# Patient Record
Sex: FEMALE | Race: WHITE | NOT HISPANIC OR LATINO | Employment: OTHER | URBAN - METROPOLITAN AREA
[De-identification: names, ages, dates, MRNs, and addresses within clinical notes are randomized per-mention and may not be internally consistent; named-entity substitution may affect disease eponyms.]

---

## 2017-02-01 PROBLEM — Z79.899 ENCOUNTER FOR LONG-TERM (CURRENT) USE OF OTHER MEDICATIONS: Status: ACTIVE | Noted: 2017-02-01

## 2017-02-01 PROBLEM — E79.0 HYPERURICEMIA: Status: ACTIVE | Noted: 2017-02-01

## 2020-05-22 ENCOUNTER — LAB VISIT (OUTPATIENT)
Dept: PRIMARY CARE CLINIC | Facility: CLINIC | Age: 75
End: 2020-05-22
Payer: MEDICARE

## 2020-05-22 DIAGNOSIS — R05.9 COUGH: Primary | ICD-10-CM

## 2020-05-22 PROCEDURE — U0003 INFECTIOUS AGENT DETECTION BY NUCLEIC ACID (DNA OR RNA); SEVERE ACUTE RESPIRATORY SYNDROME CORONAVIRUS 2 (SARS-COV-2) (CORONAVIRUS DISEASE [COVID-19]), AMPLIFIED PROBE TECHNIQUE, MAKING USE OF HIGH THROUGHPUT TECHNOLOGIES AS DESCRIBED BY CMS-2020-01-R: HCPCS

## 2020-05-23 LAB — SARS-COV-2 RNA RESP QL NAA+PROBE: NOT DETECTED

## 2020-12-21 ENCOUNTER — PATIENT MESSAGE (OUTPATIENT)
Dept: OTHER | Facility: OTHER | Age: 75
End: 2020-12-21

## 2021-01-11 ENCOUNTER — IMMUNIZATION (OUTPATIENT)
Dept: FAMILY MEDICINE | Facility: CLINIC | Age: 76
End: 2021-01-11
Payer: MEDICARE

## 2021-01-11 DIAGNOSIS — Z23 NEED FOR VACCINATION: ICD-10-CM

## 2021-01-11 PROCEDURE — 91300 COVID-19, MRNA, LNP-S, PF, 30 MCG/0.3 ML DOSE VACCINE: CPT | Mod: PBBFAC,PO

## 2021-02-01 ENCOUNTER — IMMUNIZATION (OUTPATIENT)
Dept: FAMILY MEDICINE | Facility: CLINIC | Age: 76
End: 2021-02-01
Payer: MEDICARE

## 2021-02-01 DIAGNOSIS — Z23 NEED FOR VACCINATION: Primary | ICD-10-CM

## 2021-02-01 PROCEDURE — 91300 COVID-19, MRNA, LNP-S, PF, 30 MCG/0.3 ML DOSE VACCINE: CPT | Mod: PBBFAC,PO

## 2021-02-01 PROCEDURE — 0002A COVID-19, MRNA, LNP-S, PF, 30 MCG/0.3 ML DOSE VACCINE: CPT | Mod: PBBFAC,PO

## 2021-09-27 ENCOUNTER — IMMUNIZATION (OUTPATIENT)
Dept: FAMILY MEDICINE | Facility: CLINIC | Age: 76
End: 2021-09-27
Payer: MEDICARE

## 2021-09-27 DIAGNOSIS — Z23 NEED FOR VACCINATION: Primary | ICD-10-CM

## 2021-09-27 PROCEDURE — 0003A COVID-19, MRNA, LNP-S, PF, 30 MCG/0.3 ML DOSE VACCINE: CPT | Mod: PBBFAC | Performed by: FAMILY MEDICINE

## 2021-09-27 PROCEDURE — 91300 COVID-19, MRNA, LNP-S, PF, 30 MCG/0.3 ML DOSE VACCINE: CPT | Mod: PBBFAC,PO

## 2023-11-29 ENCOUNTER — TELEPHONE (OUTPATIENT)
Dept: SURGERY | Facility: CLINIC | Age: 78
End: 2023-11-29
Payer: COMMERCIAL

## 2023-11-29 NOTE — TELEPHONE ENCOUNTER
"Called to speak to patient about rescheduling her MRI, patient states that she feels like she can not breath due to being "squished" in the first attempt at the MRI test. She stated that she thinks she will not be able to endure the test and inquired about the alternatives. I told her we could discuss it tomorrow and instructed her to go to the ER immediately to be evaluated for shortness of breath, she stated that she did not want to wait around at the ER, I reinforced the need to go to the ER for SOB and contact information provided for [facility] ER, she agreed to go.   "

## 2023-11-30 DIAGNOSIS — D05.11 DUCTAL CARCINOMA IN SITU (DCIS) OF RIGHT BREAST: Primary | ICD-10-CM

## 2023-12-01 ENCOUNTER — TELEPHONE (OUTPATIENT)
Dept: HEMATOLOGY/ONCOLOGY | Facility: CLINIC | Age: 78
End: 2023-12-01
Payer: COMMERCIAL

## 2023-12-01 NOTE — PROGRESS NOTES
[facility] - Breast Surgery   History and Physical    Subjective:      Mina Quinonez is a 78 y.o. female     No symptoms. Found on imaging.     No MRI. Could not tolerate  CT chest negative    PMHX  COPD  ER visit  for rib and mid back pain after MRI breast attempted. CT neg  BMI 42   DM  A1C 9.6 2022  Hysterectomy with BSO and appy at 45    Menarche at age 13 years old . . Age at first live birth n/a. breast feed n/a. LMP n/a.  Menopause at 45 years old. HRT- Took  premarin for 10 years   Personal history of ovarian/breast No history. Had  previous breast biopsy 2019. Denies Genetic testing.    Family history cancer:Father Prostate cancer                                     Sister Uterine cancer                                     3 Paternal Aunts breast cancer    Smoker Pk/yr quit date:never a smoker  Covid-19 vaccine with all booster right arm     Relevant medical history: Biopsy preformed at [facility]    Relevant info: Patient is alone       Patient Active Problem List   Diagnosis    DM w/o complication type II    Essential hypertension    Hyperlipidemia    Hypothyroidism due to acquired atrophy of thyroid    Vitamin D deficiency    Ductal carcinoma in situ (DCIS) of right breast     Current Outpatient Medications on File Prior to Visit   Medication Sig Dispense Refill    ASPIRIN (ASPIR-81 ORAL) 1 tablet once daily.      atorvastatin (LIPITOR) 20 MG tablet TAKE 1 TABLET EVERY DAY 90 tablet 3    FLUAD QUAD 2020-21,65Y UP,,PF, 60 mcg (15 mcg x 4)/0.5 mL Syrg ADM 0.5ML IM UTD      FREESTYLE LANCETS 28 gauge lancets 1 each once daily.  11    FREESTYLE LITE STRIPS Strp 1 strip once daily.   11    levothyroxine (SYNTHROID) 100 MCG tablet Take 1 tablet (100 mcg total) by mouth once daily. 90 tablet 0    lisinopriL 10 MG tablet TAKE 1 TABLET EVERY DAY 90 tablet 3    metFORMIN (GLUCOPHAGE-XR) 500 MG ER 24hr tablet Take 1 tablet (500 mg total) by mouth once daily. 90 tablet 3     metoprolol succinate (TOPROL-XL) 25 MG 24 hr tablet TAKE 1 TABLET EVERY DAY 90 tablet 3    SITagliptan-metformin (JANUMET) 50-1,000 mg per tablet Take 1 tablet by mouth once daily. 90 tablet 3    TYRVAYA 0.03 mg/spray sprm       XIIDRA 5 % Dpet       HYDROcodone-acetaminophen (NORCO) 5-325 mg per tablet Take 1 tablet by mouth every 6 (six) hours as needed for Pain. (Patient not taking: Reported on 12/4/2023) 12 tablet 0    ipratropium-albuteroL (COMBIVENT)  mcg/actuation inhaler Inhale 1 puff into the lungs every 6 (six) hours as needed for Wheezing. Rescue 4 g 2    levalbuterol (XOPENEX) 0.63 mg/3 mL nebulizer solution Take 3 mLs (0.63 mg total) by nebulization every 6 (six) hours as needed for Wheezing. Rescue 120 mL 1    naproxen sodium (ANAPROX) 220 MG tablet 2 tablets once as needed.       No current facility-administered medications on file prior to visit.     Review of patient's allergies indicates:   Allergen Reactions    Levofloxacin      Other reaction(s): severe leg cramps     Past Medical History:   Diagnosis Date    Allergy     Diabetes type 2, controlled     Hyperlipidemia     Hypertension     Thyroid disease      Past Surgical History:   Procedure Laterality Date    APPENDECTOMY      BREAST BIOPSY Right 05/06/2019    benign spindle cell tumor    BREAST BIOPSY Right 05/06/2019    benign lymph node    DENTAL SURGERY      HYSTERECTOMY      KNEE SURGERY Right     OOPHORECTOMY      orif arm Left      Family History   Problem Relation Age of Onset    Hypertension Mother     Hyperlipidemia Mother     Hypertension Father     Hyperlipidemia Father     Prostate cancer Father     Breast cancer Paternal Aunt         unknown age    Breast cancer Paternal Aunt         unknown age    Breast cancer Paternal Aunt      Social History     Socioeconomic History    Marital status: Single   Tobacco Use    Smoking status: Never    Smokeless tobacco: Never   Substance and Sexual Activity    Alcohol use: Yes      "Comment: rarely    Drug use: No    Sexual activity: Never     Social Determinants of Health     Financial Resource Strain: Medium Risk (12/20/2022)    Overall Financial Resource Strain (CARDIA)     Difficulty of Paying Living Expenses: Somewhat hard   Food Insecurity: No Food Insecurity (12/20/2022)    Hunger Vital Sign     Worried About Running Out of Food in the Last Year: Never true     Ran Out of Food in the Last Year: Never true   Transportation Needs: No Transportation Needs (12/20/2022)    PRAPARE - Transportation     Lack of Transportation (Medical): No     Lack of Transportation (Non-Medical): No   Physical Activity: Sufficiently Active (12/20/2022)    Exercise Vital Sign     Days of Exercise per Week: 4 days     Minutes of Exercise per Session: 60 min   Stress: Stress Concern Present (12/20/2022)    [employer] [place] [employer] - Occupational Stress Questionnaire     Feeling of Stress : To some extent   Social Connections: Unknown (12/20/2022)    Social Connection and Isolation Panel [NHANES]     Frequency of Communication with Friends and Family: More than three times a week     Frequency of Social Gatherings with Friends and Family: More than three times a week     Active Member of Clubs or Organizations: Yes     Attends Club or Organization Meetings: More than 4 times per year     Marital Status: Never    Housing Stability: Unknown (12/20/2022)    Housing Stability Vital Sign     Unable to Pay for Housing in the Last Year: No     Unstable Housing in the Last Year: No         Review of Systems    No CP, No SOB      Objective:      BP (!) 154/90 (BP Location: Right arm, Patient Position: Sitting, BP Method: Large (Automatic))   Pulse 95   Temp 98 °F (36.7 °C) (Temporal)   Resp 16   Ht 5' 5" (1.651 m)   Wt 113.5 kg (250 lb 3.6 oz)   SpO2 96%   BMI 41.64 kg/m²     Constitutional: NAD AAOX4  HEENT: EOMI, nonicteric sclera  NECK: no mass, no thyromegaly, no lymphadenopathy  CV: " regular rate  PULM: good respiratory effort  ABDOMEN: soft, Nontender, nondistended. No guarding. No rebound.  MUSCULOSKELETAL: Good ROM  SKIN: No rashes or ulcerations seen.   NEUROLOGIC: CN grossly intact. Motor, sensory grossly intact    Breast exam   pendulous  Right: No mass, discharge, dimpling, lymphadenopathy  Mild bruising right UOQ. No masses appreciated   Left:  No mass, discharge, dimpling, lymphadenopathy      No visits with results within 6 Week(s) from this visit.   Latest known visit with results is:   Lab Visit on 12/16/2022   Component Date Value Ref Range Status    WBC 12/16/2022 3.91  3.90 - 12.70 K/uL Final    RBC 12/16/2022 3.78 (L)  4.00 - 5.40 M/uL Final    Hemoglobin 12/16/2022 11.4 (L)  12.0 - 16.0 g/dL Final    Hematocrit 12/16/2022 34.1 (L)  37.0 - 48.5 % Final    MCV 12/16/2022 90  82 - 98 fL Final    MCH 12/16/2022 30.2  27.0 - 31.0 pg Final    MCHC 12/16/2022 33.4  32.0 - 36.0 g/dL Final    RDW 12/16/2022 12.8  11.5 - 14.5 % Final    Platelets 12/16/2022 162  150 - 450 K/uL Final    MPV 12/16/2022 9.9  9.2 - 12.9 fL Final    Immature Granulocytes 12/16/2022 0.5  0.0 - 0.5 % Final    Gran # (ANC) 12/16/2022 2.3  1.8 - 7.7 K/uL Final    Immature Grans (Abs) 12/16/2022 0.02  0.00 - 0.04 K/uL Final    Comment: Mild elevation in immature granulocytes is non specific and   can be seen in a variety of conditions including stress response,   acute inflammation, trauma and pregnancy. Correlation with other   laboratory and clinical findings is essential.      Lymph # 12/16/2022 1.1  1.0 - 4.8 K/uL Final    Mono # 12/16/2022 0.4  0.3 - 1.0 K/uL Final    Eos # 12/16/2022 0.1  0.0 - 0.5 K/uL Final    Baso # 12/16/2022 0.02  0.00 - 0.20 K/uL Final    nRBC 12/16/2022 0  0 /100 WBC Final    Gran % 12/16/2022 58.2  38.0 - 73.0 % Final    Lymph % 12/16/2022 29.2  18.0 - 48.0 % Final    Mono % 12/16/2022 9.0  4.0 - 15.0 % Final    Eosinophil % 12/16/2022 2.6  0.0 - 8.0 % Final    Basophil % 12/16/2022  0.5  0.0 - 1.9 % Final    Differential Method 12/16/2022 Automated   Final    Sodium 12/16/2022 136  136 - 145 mmol/L Final    Potassium 12/16/2022 4.6  3.5 - 5.1 mmol/L Final    Chloride 12/16/2022 104  95 - 110 mmol/L Final    CO2 12/16/2022 28  22 - 31 mmol/L Final    Glucose 12/16/2022 230 (H)  70 - 110 mg/dL Final    Comment: The ADA recommends the following guidelines for fasting glucose:    Normal:       less than 100 mg/dL    Prediabetes:  100 mg/dL to 125 mg/dL    Diabetes:     126 mg/dL or higher      BUN 12/16/2022 16  7 - 18 mg/dL Final    Creatinine 12/16/2022 0.67  0.50 - 1.40 mg/dL Final    Calcium 12/16/2022 9.4  8.4 - 10.2 mg/dL Final    Total Protein 12/16/2022 6.5  6.0 - 8.4 g/dL Final    Albumin 12/16/2022 3.7  3.5 - 5.2 g/dL Final    Total Bilirubin 12/16/2022 0.7  0.2 - 1.3 mg/dL Final    Alkaline Phosphatase 12/16/2022 89  38 - 145 U/L Final    AST 12/16/2022 21  14 - 36 U/L Final    ALT 12/16/2022 22  0 - 35 U/L Final    Anion Gap 12/16/2022 4 (L)  8 - 16 mmol/L Final    eGFR 12/16/2022 >60  >60 mL/min/1.73 m^2 Final    Cholesterol 12/16/2022 131  120 - 199 mg/dL Final    Comment: The National Cholesterol Education Program (NCEP) has set the  following guidelines (reference ranges) for Cholesterol:  Optimal.....................<200 mg/dL  Borderline High.............200-239 mg/dL  High........................> or = 240 mg/dL      Triglycerides 12/16/2022 193 (H)  30 - 150 mg/dL Final    Comment: The National Cholesterol Education Program (NCEP) has set the  following guidelines (reference values) for triglycerides:  Normal......................<150 mg/dL  Borderline High.............150-199 mg/dL  High........................200-499 mg/dL      HDL 12/16/2022 30 (L)  40 - 75 mg/dL Final    Comment: The National Cholesterol Education Program (NCEP) has set the  following guidelines (reference values) for HDL Cholesterol:  Low...............<40 mg/dL  Optimal...........>60 mg/dL      LDL  Cholesterol 12/16/2022 62.4 (L)  63.0 - 159.0 mg/dL Final    Comment: The National Cholesterol Education Program (NCEP) has set the  following guidelines (reference values) for LDL Cholesterol:  Optimal.......................<130 mg/dL  Borderline High...............130-159 mg/dL  High..........................160-189 mg/dL  Very High.....................>190 mg/dL      HDL/Cholesterol Ratio 12/16/2022 22.9  20.0 - 50.0 % Final    Total Cholesterol/HDL Ratio 12/16/2022 4.4  2.0 - 5.0 Final    Non-HDL Cholesterol 12/16/2022 101  mg/dL Final    Comment: Risk category and Non-HDL cholesterol goals:  Coronary heart disease (CHD)or equivalent (10-year risk of CHD >20%):  Non-HDL cholesterol goal     <130 mg/dL  Two or more CHD risk factors and 10-year risk of CHD <= 20%:  Non-HDL cholesterol goal     <160 mg/dL  0 to 1 CHD risk factor:  Non-HDL cholesterol goal     <190 mg/dL      Hemoglobin A1C 12/16/2022 9.6 (H)  0.0 - 5.6 % Final    Comment: Reference Interval:  5.0 - 5.6 Normal   5.7 - 6.4 High Risk   > 6.5 Diabetic       Hgb A1c results are standardized based on the (NGSP) National   Glycohemoglobin Standardization Program.      Hemoglobin A1C levels are related to mean serum/plasma glucose   during the preceding 2-3 months.          Estimated Avg Glucose 12/16/2022 229 (H)  68 - 131 mg/dL Final    Vit D, 25-Hydroxy 12/16/2022 36  30 - 96 ng/mL Final    Comment: Vitamin D deficiency.........<10 ng/mL                              Vitamin D insufficiency......10-29 ng/mL       Vitamin D sufficiency........> or equal to 30 ng/mL  Vitamin D toxicity............>100 ng/mL      Microalbumin, Urine 12/16/2022 15.6  ug/mL Final    Creatinine, Urine 12/16/2022 89.5  15.0 - 325.0 mg/dL Final    Microalb/Creat Ratio 12/16/2022 17.4  0.0 - 30.0 ug/mg Final         Patient Active Problem List   Diagnosis    DM w/o complication type II    Essential hypertension    Hyperlipidemia    Hypothyroidism due to acquired atrophy of  thyroid    Vitamin D deficiency    Ductal carcinoma in situ (DCIS) of right breast        High complexity of problems addressed by Dr. Danielson - breast cancer, treatment options, expectations, effects of treatment, risks, benefits. Extensive and complex data reviewed, independent interpretation of tests, discussion of management with another health care provider.    Alternative efficacious methods of treatment of breast cancer were given.  Options including lumpectomy, mastectomy, reconstruction options with advantages/disadvantages of each, provisions assuring coverage of reconstructive surgery costs, how the patient may access reconstructive care including the potential to be transferred to a facility that provides reconstructive care or choosing reconstruction after completion of breast cancer surgery and treatment.  [facility], [facility] breast cancer brochure given.    12/04/2023 reviewed all complex data.  MA/NP functioned as a scribe.  Services and exam were personally performed, decisions made, complex data reviewed by Dr. Danielson.    I have given her all options - lumpectomy XRT, unilateral or bilateral mastectomy +/- reconstruction. I have explained procedure, risks, benefits, postop expectations. All questions answered. Risks include but are not limited to infection, bleeding, injury to nerves, vessels, numbness, weakness, difficulty lifting arm, swelling of arm (lymphedema), inability to remove all lesion, residual breast tissue, recurrence of malignancy, need for further procedure, loss of skin flap, seroma (fluid collection), inability to find sentinel lymph node, need for axillary dissection, chronic pain, radioactive substance may be given, allergic reaction, staining of the skin, difficulty with future imaging, close or positive margins requiring additional surgery, vascular clotting, pulmonary embolus.    Saw [facility] with Dr Quiles medical oncologist and Dr Franco radiation  oncologist.          Imaging and Chronology:     5/7/2018 Bilateral Screening Mammo - [facility]   No mammographic evidence of malignancy.   BI-RADS Category 1: Negative    4/5/2019 Bilateral Screening Mammo - [facility]   Right breast asymmetry at the upper middle position.    BI-RADS Category: Overall: 0 - Incomplete: Needs Additional Imaging Evaluation   Recommendation: Diagnostic mammogram with possible ultrasound (if indicated) is recommended.    4/25/2019 Right Dx Mammo, Right Complete US - [facility]   Right 9 O'C,  2 mm x 2 mm x 3 mm cyst. Assessment: 4A - Suspicious finding. Biopsy is recommended.   Right 10 O'C, 3 mm x 4 mm x 3 mm mass. Assessment: 4A - Suspicious finding. Biopsy is recommended.     BI-RADS Category: Right: 4A - Low Suspicion for Malignancy. 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     Right Breast 9 O'C, 3 CFN, CNB, Twirl Clip   Benign Lymph Node     11/20/2019 Right Limited US - [facility]   Right 10 O'C,  3 mm x 3 mm x 3 mm mass corresponds to the biopsy-proven benign spindle cell tumor and is stable.   Given that no surgical excision was performed, recommend continued short interval follow-up to ensure stability.   BI-RADS Category: Right: 3 - Probably Benign. Overall: 3 - Probably Benign   Recommendation: Short interval follow-up ultrasound is recommended to correspond with patient's annual mammogram due in April 2020.    8/17/2020 Bilateral Dx Mammo, Right Limited US - [facility]   Right 10 O'C, 3 mm x 3 mm x 3 mm mass.   BI-RADS Category: Overall: 3 - Probably Benign   Recommendation: Diagnostic Mammogram and targeted ultrasound in 1 year is recommended to complete 2 year surveillance.    9/23/2021 Bilateral Dx Mammo, Right Limited US - [facility]   There is no mammographic or sonographic evidence of malignancy.   Biopsy proven 3 x 3 x 3 mm benign spindle cell tumor is  unchanged since 2019.   BI-RADS Category: Overall: 2 - Benign   Recommendation: Diagnostic Mammogram and targeted right breast ultrasound in 1 year is recommended.    11/2/2022 Bilateral Dx Mammo, Right Limited US - [facility]   No mammographic, sonographic evidence of malignancy.  BI-RADS Category: Overall: 2 - Benign   Recommendation: Routine screening mammogram in 1 year is recommended. Study to be read same day by Dr. Diaz prior to patient leaving.      11/07/2023 Bilateral Screening Mammo - [facility]  Left asymmetry at the outer posterior position. Assessment: 0 - Incomplete. Diagnostic Mammogram and/or Ultrasound is recommended.   Right focal asymmetry at the upper outer posterior position with few associated calcifications. Assessment: 0 - Incomplete. Diagnostic Mammogram and/or Ultrasound is recommended.    BI-RADS Category: Overall: 0 - Incomplete: Needs Additional Imaging Evaluation   Recommendation:Diagnostic mammogram with possible ultrasound (if indicated) is recommended.    11/13/2023 Bilateral Dx Mammo, Bilateral Limited US - [facility]   Left the asymmetry corresponds to an axillary lymph node without suspicious features.   Right 7 mm calcifications at the upper outer posterior position. Assessment: 4A - Suspicious finding. Stereotactic Biopsy is recommended.   BI-RADS Category: Overall: 4 - Suspicious   Recommendation:Stereotactic Biopsy is recommended.    11/15/2023 Right Breast UOQ Posterior Depth, Stereotactic Bx, X Clip - [facility]   Intermediate nuclear grade DCIS   ER 97.6    MRI Breast - [facility]   Could not tolerate    11/29/23 CT chest  No acute findings    12/4/23 invitae genetics negative 48 gene        Assessment/Plan:      Cancer Staging   Ductal carcinoma in situ (DCIS) of right breast  Staging form: Breast, AJCC 8th Edition  - Clinical stage from 12/4/2023: Stage 0 (cTis (DCIS), cN0, cM0, G2, ER+, NM: Not Assessed, HER2: Not Assessed) - Signed by Marj Danielson MD on 12/4/2023    Right UOQ posterior depth  intermediate grade DCIS ER 98    Has all surgical options.  Lumpectomy, mastectomy, reconstruction.    Right upper outer quadrant posterior depth 7 mm calcifications UBALDO  lumpectomy.  Likely no need for sentinel node unless findings show otherwise with US.  Options discussed including sentinel node biopsy.    Plan right UOQ ubaldo lumpectomy. No SLNB 12/21/23(changed, see below) main OR ([facility] ok)    Radiation, endocrine.  Final recs after final path.     Genetics    Pt would like to also see Dr Pride med onc. Will place referral.   CLIPS for Dr. Franco     Addendum 12/7/23  Pt wants after jan 1  Getting root canal next week  Plan 1/11/24 main OR ([facility] ok)    Addendum 1/3/24  12/29/23 A1C 10.3  Notify PCP    Diabetes increases multiple risks including but not limited to infection, poor healing, seromas, clots, PE, death, ischemia, stroke, complications.     Genetics neg  Initial limited US as above

## 2023-12-04 ENCOUNTER — OFFICE VISIT (OUTPATIENT)
Dept: RADIATION ONCOLOGY | Facility: CLINIC | Age: 78
End: 2023-12-04
Payer: MEDICARE

## 2023-12-04 ENCOUNTER — OFFICE VISIT (OUTPATIENT)
Dept: HEMATOLOGY/ONCOLOGY | Facility: CLINIC | Age: 78
End: 2023-12-04
Payer: MEDICARE

## 2023-12-04 ENCOUNTER — DOCUMENTATION ONLY (OUTPATIENT)
Dept: SURGERY | Facility: CLINIC | Age: 78
End: 2023-12-04

## 2023-12-04 ENCOUNTER — TELEPHONE (OUTPATIENT)
Dept: HEMATOLOGY/ONCOLOGY | Facility: CLINIC | Age: 78
End: 2023-12-04

## 2023-12-04 ENCOUNTER — OFFICE VISIT (OUTPATIENT)
Dept: SURGERY | Facility: CLINIC | Age: 78
End: 2023-12-04
Payer: MEDICARE

## 2023-12-04 VITALS
RESPIRATION RATE: 16 BRPM | RESPIRATION RATE: 16 BRPM | HEART RATE: 96 BPM | RESPIRATION RATE: 16 BRPM | HEART RATE: 95 BPM | HEIGHT: 65 IN | WEIGHT: 250.25 LBS | OXYGEN SATURATION: 95 % | DIASTOLIC BLOOD PRESSURE: 90 MMHG | TEMPERATURE: 98 F | SYSTOLIC BLOOD PRESSURE: 154 MMHG | HEIGHT: 65 IN | SYSTOLIC BLOOD PRESSURE: 154 MMHG | WEIGHT: 250.25 LBS | BODY MASS INDEX: 41.69 KG/M2 | WEIGHT: 250.25 LBS | TEMPERATURE: 98 F | OXYGEN SATURATION: 96 % | TEMPERATURE: 98 F | DIASTOLIC BLOOD PRESSURE: 90 MMHG | SYSTOLIC BLOOD PRESSURE: 154 MMHG | BODY MASS INDEX: 41.69 KG/M2 | DIASTOLIC BLOOD PRESSURE: 90 MMHG | HEIGHT: 65 IN | BODY MASS INDEX: 41.69 KG/M2 | OXYGEN SATURATION: 95 % | HEART RATE: 95 BPM

## 2023-12-04 DIAGNOSIS — D05.11 DUCTAL CARCINOMA IN SITU (DCIS) OF RIGHT BREAST: Primary | ICD-10-CM

## 2023-12-04 DIAGNOSIS — E03.4 HYPOTHYROIDISM DUE TO ACQUIRED ATROPHY OF THYROID: ICD-10-CM

## 2023-12-04 DIAGNOSIS — E78.5 HYPERLIPIDEMIA, UNSPECIFIED HYPERLIPIDEMIA TYPE: ICD-10-CM

## 2023-12-04 DIAGNOSIS — D05.11 DUCTAL CARCINOMA IN SITU (DCIS) OF RIGHT BREAST: ICD-10-CM

## 2023-12-04 PROCEDURE — 99999 PR PBB SHADOW E&M-EST. PATIENT-LVL IV: ICD-10-PCS | Mod: PBBFAC,,, | Performed by: RADIOLOGY

## 2023-12-04 PROCEDURE — 99205 OFFICE O/P NEW HI 60 MIN: CPT | Mod: S$GLB,,, | Performed by: SURGERY

## 2023-12-04 PROCEDURE — 99205 PR OFFICE/OUTPT VISIT, NEW, LEVL V, 60-74 MIN: ICD-10-PCS | Mod: S$GLB,,, | Performed by: RADIOLOGY

## 2023-12-04 PROCEDURE — 99999 PR PBB SHADOW E&M-EST. PATIENT-LVL III: CPT | Mod: PBBFAC,,, | Performed by: INTERNAL MEDICINE

## 2023-12-04 PROCEDURE — 99205 OFFICE O/P NEW HI 60 MIN: CPT | Mod: S$GLB,,, | Performed by: RADIOLOGY

## 2023-12-04 PROCEDURE — 99205 PR OFFICE/OUTPT VISIT, NEW, LEVL V, 60-74 MIN: ICD-10-PCS | Mod: S$GLB,,, | Performed by: SURGERY

## 2023-12-04 PROCEDURE — 99999 PR PBB SHADOW E&M-EST. PATIENT-LVL V: ICD-10-PCS | Mod: PBBFAC,,, | Performed by: SURGERY

## 2023-12-04 PROCEDURE — 99204 PR OFFICE/OUTPT VISIT, NEW, LEVL IV, 45-59 MIN: ICD-10-PCS | Mod: S$GLB,,, | Performed by: INTERNAL MEDICINE

## 2023-12-04 PROCEDURE — 99204 OFFICE O/P NEW MOD 45 MIN: CPT | Mod: S$GLB,,, | Performed by: INTERNAL MEDICINE

## 2023-12-04 PROCEDURE — 99999 PR PBB SHADOW E&M-EST. PATIENT-LVL IV: CPT | Mod: PBBFAC,,, | Performed by: RADIOLOGY

## 2023-12-04 PROCEDURE — 99214 OFFICE O/P EST MOD 30 MIN: CPT | Mod: PBBFAC,PN | Performed by: RADIOLOGY

## 2023-12-04 PROCEDURE — 99999 PR PBB SHADOW E&M-EST. PATIENT-LVL V: CPT | Mod: PBBFAC,,, | Performed by: SURGERY

## 2023-12-04 PROCEDURE — 99999 PR PBB SHADOW E&M-EST. PATIENT-LVL III: ICD-10-PCS | Mod: PBBFAC,,, | Performed by: INTERNAL MEDICINE

## 2023-12-04 NOTE — PROGRESS NOTES
Met with the patient to discuss her new diagnosis and what she has learned thus far. All questions and concerns addressed.  Follow-up appts discussed and a folder with NCCN patient guidelines book on Invasive Breast Cancer given to her. Also given a copy of the [facility] Board of Medical Examiners brochure on Introductory Guide to Breast Cancer Treatment Options. Detailed information was discussed with the patient on Integrative Oncology services offered, support groups and classes, genetics and lymphedema management. Written copies were provided as well. Contact information given to her for nurse navigation and she was told to call for any questions or concerns. She verbalized understanding.   The following was explained in detail. The patient understands that this is a voluntary test.    PURPOSE: This test analyzes a specific gene or genes for genetic changes called mutations. This test will help determine if a person has a significantly increased risk if developing certain tumors due to a mutation in a cancer predisposing gene.    TEST RESULTS & INTERPRETATION: Possible result outcomes include positive, negative, and uncertain. A positive mutation is associated with an increased risk for hereditary cancer. A negative result is interpreted that a mutation was not identified. Uncertain means a genetic change was detected but it is not known if this change is linked to cancer risk.    BENEFITS: The benefits include informed choices about health care such as screening, risk reducing surgeries and preventive medication strategies.    RISKS: Concerns regarding possible health insurance discrimination, most states and the federal government have enacted laws to prohibit genetic discrimination.    LIMITATIONS: This test analyzes only certain important genes associated with a specific hereditary cancer syndrome. Genetic testing clarifies cancer risks for only those cancers related to the genes analyzed.    FINANCIAL  RESPONSIBILITY: Genetic testing of appropriate individuals is typically reimbursed by health insurance. You are responsible for any cost of the genetic test not reimbursed by insurance.     PATIENT INSTRUCTIONS & COLLECTION PROCESS:  Saliva collected.  Place tube into plastic box and sent off through [employer]  Informed patient results can take up to 2-4 weeks.  Will call patient with results.

## 2023-12-04 NOTE — PROGRESS NOTES
Name: Mina Quinonez  MRN:  [id number]  :  1945 Age 78 y.o.  Date of Service: 2023    Reason for visit:  Mina Quinonez is a 78 y.o. female here regarding...    #Right Sided DCIS of the breast   Date of Original Diagnosis: 23  Original Stage: Tis ER positive Intermediate grade   Current Sites of Disease: Right Breast   Current Goals of Therapy: curative   Current Therapy: pending multi D discussion     Oncologic History/History of Present Illness:   Detected by screening mammogram, no symptoms.    23 Mammogram Right: Right breast 7 mm calcifications at the upper outer posterior position .  Left nml.   11/15/23 Bx w/ DCIS     Menarche: age 13  First Child :   Use of Systemic Hormonal Therapy:premarin x 10 yrs  Menopausal: age 45 (full hysterectomy)  Prior Breast Biopsy:yes   Pertinent Family Hx:father w/ prostate ca, sister with uterine cancer; 3 paternal aunts w/ breast cancer.     Never smoker    Lives alone.     Active and volunteers    Past Medical History:   Diagnosis Date    Allergy     Diabetes type 2, controlled     Hyperlipidemia     Hypertension     Thyroid disease        Past Surgical History:   Procedure Laterality Date    APPENDECTOMY      BREAST BIOPSY Right 2019    benign spindle cell tumor    BREAST BIOPSY Right 2019    benign lymph node    DENTAL SURGERY      HYSTERECTOMY      KNEE SURGERY Right     OOPHORECTOMY      orif arm Left        Allergies as of 2023 - Reviewed 2023   Allergen Reaction Noted    Levofloxacin  2015       Family History   Problem Relation Age of Onset    Hypertension Mother     Hyperlipidemia Mother     Hypertension Father     Hyperlipidemia Father     Prostate cancer Father     Breast cancer Paternal Aunt         unknown age    Breast cancer Paternal Aunt         unknown age    Breast cancer Paternal Aunt        Social History     Tobacco Use    Smoking status: Never    Smokeless tobacco: Never   Substance Use Topics     Alcohol use: Yes     Comment: rarely    Drug use: No         PHYSICAL EXAMINATION:  There were no vitals taken for this visit.  Wt Readings from Last 3 Encounters:   11/29/23 114.6 kg (252 lb 10.4 oz)   11/15/23 79.4 kg (175 lb)   11/07/23 79.4 kg (175 lb)     ECOG PERFORMANCE STATUS: 0  Physical Exam   General:  Well-appearing, nontoxic  Eyes:  Equal and round pupils, EOMI, no scleral icterus  Mouth:  No lesions, moist  Cardiovascular:  Warm, well-perfused, no peripheral edema  Lungs:  Unlabored on room air, no wheezing  Neurologic:  Awake, alert and oriented, participating in the exam  Psych:  Appropriate mood and affect  Skin:  Normal pallor, No rashes  Heme:  No petechiae, no purpura  Breast:     LABORATORY:  CBC  Lab Results   Component Value Date    WBC 3.91 12/16/2022    HGB 11.4 (L) 12/16/2022    HCT 34.1 (L) 12/16/2022    MCV 90 12/16/2022     12/16/2022         BMP  Lab Results   Component Value Date     12/16/2022    K 4.6 12/16/2022     12/16/2022    CO2 28 12/16/2022    BUN 16 12/16/2022    CREATININE 0.67 12/16/2022    CALCIUM 9.4 12/16/2022    ANIONGAP 4 (L) 12/16/2022    ESTGFRAFRICA >60 12/18/2020    EGFRNONAA >60 12/18/2020         PATHOLOGY:  11/15/23  RIGHT UPPER OUTER QUADRANT POSTERIOR DEPTH CORE NEEDLE BIOPSIES:   - IN SITU DUCT CARCINOMA, CRIBRIFORM TYPE, INTERMEDIATE NUCLEAR GRADE    WITH NECROSIS.   GRADE: INTERMEDIATE    ER: POSITIVE       RADIOLOGY:        ASSESSMENT AND PLAN:  Mina Quinonez is a 78 y.o. female with...    #Right Sided DCIS of the breast   Date of Original Diagnosis: 11/25/23  Original Stage: Tis ER positive Intermediate grade   Current Sites of Disease: Right Breast   Current Goals of Therapy: curative   Current Therapy: pending multi D discussion     I counseled the patient regarding the stage of her disease in the role of adjuvant endocrine therapy therapy for DCIS.  I counseled the patient regarding the proposed endocrine therapy of anastrozole 1 mg  once daily for a duration of 5 years to prevent breast cancer recurrence in her ipsilateral breast and protect the contralateral breast.  I counseled the patient regarding the typical side effects of anastrozole including joint stiffness, hot flashes, vaginal dryness, bone demineralization.    #Bone Health  -patient will be on endocrine therapy x 5 years, will get baseline DEXA scan to assess bone health    #Lipids- at risk for hyperlipidemia while on endocrine therapy; advise to continue routine PCP visits with lipid checks, already on a statin    #hypothyroidism- energy at baseline; on levothyroxine      Amber Quiles M.D.  Hematology/Oncology     Route Chart for Scheduling    Med Onc Chart Routing      Follow up with physician . Post operative with pathology report   Follow up with KETTY    Infusion scheduling note    Injection scheduling note    Labs   Scheduling:  Preferred lab:  Lab interval:  No labs   Imaging DXA scan   wants to get done at [facility]   Pharmacy appointment    Other referrals

## 2023-12-04 NOTE — NURSING
Met with patient in multi disciplinary clinic today.  Explained my role as navigator.  Reviewed plan of care and answered questions. Dr. Quiles will see her after surgery.  My contact information was provided and she was encouraged to call with any questions or concerns.  She verbalized understanding.

## 2023-12-04 NOTE — PROGRESS NOTES
12/04/2023    [facility]   Radiation Oncology Consultation    ASSESSMENT  This is a 78 y.o. y/o female with Stage 0 (Tis, N0, M0, Grade 2, ER+/GA not assessed) DCIS of the right breast. She is seen as part of Multidisciplinary Breast Clinic prior to initiation of therapy for discussion of treatment options.       PLAN    Treatment options were discussed with the patient including breast conservation with lumpectomy and adjuvant radiation therapy vs mastectomy without radiation therapy.  We discussed the goals of treatment to be curative.  The risks, benefits, scheduling, alternatives to and rationale of radiation therapy were explained in detail.   We discussed the indications, course, and potential risks associated with radiation therapy, including but not limited to fatigue, local skin reaction such as hyperpigmentation, tenderness or erythema, breast pain, and potential long term toxicities such as rib fragility, lung inflammation, heart inflammation, and secondary malignancy.  She expressed understanding of these risks, all questions were answered to her apparent satisfaction.   After this discussion, she elected to proceed with breast conservation.     We discussed the indications, course, and potential risks associated with radiation therapy, including but not limited to fatigue, local skin reaction such as hyperpigmentation, tenderness or erythema, breast pain, and potential long term toxicities such as rib fragility, lung inflammation, and remote risk of secondary malignancy or increased risk of heart disease.  She expressed understanding of these risks, all questions were answered to her apparent satisfaction.   A CT simulation will be performed on 1/23/24 to begin the planning process for the patient's radiation therapy.     She was given our contact information, and she was told that she could call our clinic at any time if she has any questions or concerns.      Radiation Treatment  Details:   We plan to treat partial right breast to a dose of 30 Gy in 5 fractions at 6 Gy per fraction delivered daily  We will utilize a(n) IMRT technique.   We will utilize daily CT or orthogonal image guidance due to the need for accurate daily patient alignment to treat the target volumes accurately and avoid radiation overdose to multiple regional organs at risk since we are treating the patient with complex target volumes with multiple steep isodose gradients.       Chief Complaint   Patient presents with    Other    Breast Cancer       HPI: The patient is a 78 year old female with a new diagnosis of non-invasive breast cancer. She initially presented due to abnormal screening mammogram.    11/7/23 Screening mammogram:  left asymmetry at the outer position; right focal asymmetry at upper outer position    11/13/23 Bilat Diagnostic mammogram/Right ultrasound:   Left: No mammographic or sonographic evidence of malignancy  Right: 7mm Ca++ UO position    11/15/23 Right breast UOQ biopsy: ductal carcinoma in-situ, G2, cribriform with necrosis    11/29 MRI Breasts: aborted 2/2 patient discomfort/unable to tolerate    Possibility of pregnancy: No  History of prior irradiation: No  History of prior systemic anti-cancer therapy: No  History of collagen vascular disease: No  Implanted electronic device (pacer/defib/nerve stimulator): No      Past Medical History:   Diagnosis Date    Allergy     Diabetes type 2, controlled     Hyperlipidemia     Hypertension     Thyroid disease        Past Surgical History:   Procedure Laterality Date    APPENDECTOMY      BREAST BIOPSY Right 05/06/2019    benign spindle cell tumor    BREAST BIOPSY Right 05/06/2019    benign lymph node    DENTAL SURGERY      HYSTERECTOMY      KNEE SURGERY Right     OOPHORECTOMY      orif arm Left        Social History     Tobacco Use    Smoking status: Never    Smokeless tobacco: Never   Substance Use Topics    Alcohol use: Yes     Comment: rarely     Drug use: No       Cancer-related family history includes Breast cancer in her paternal aunt, paternal aunt, and paternal aunt; Prostate cancer in her father.    Current Outpatient Medications on File Prior to Visit   Medication Sig Dispense Refill    ASPIRIN (ASPIR-81 ORAL) 1 tablet once daily.      atorvastatin (LIPITOR) 20 MG tablet TAKE 1 TABLET EVERY DAY 90 tablet 3    FLUAD QUAD 2020-21,65Y UP,,PF, 60 mcg (15 mcg x 4)/0.5 mL Syrg ADM 0.5ML IM UTD      FREESTYLE LANCETS 28 gauge lancets 1 each once daily.  11    FREESTYLE LITE STRIPS Strp 1 strip once daily.   11    HYDROcodone-acetaminophen (NORCO) 5-325 mg per tablet Take 1 tablet by mouth every 6 (six) hours as needed for Pain. 12 tablet 0    levothyroxine (SYNTHROID) 100 MCG tablet Take 1 tablet (100 mcg total) by mouth once daily. 90 tablet 0    lisinopriL 10 MG tablet TAKE 1 TABLET EVERY DAY 90 tablet 3    metFORMIN (GLUCOPHAGE-XR) 500 MG ER 24hr tablet Take 1 tablet (500 mg total) by mouth once daily. 90 tablet 3    metoprolol succinate (TOPROL-XL) 25 MG 24 hr tablet TAKE 1 TABLET EVERY DAY 90 tablet 3    naproxen sodium (ANAPROX) 220 MG tablet 2 tablets once as needed.      SITagliptan-metformin (JANUMET) 50-1,000 mg per tablet Take 1 tablet by mouth once daily. 90 tablet 3    TYRVAYA 0.03 mg/spray sprm       XIIDRA 5 % Dpet       ipratropium-albuteroL (COMBIVENT)  mcg/actuation inhaler Inhale 1 puff into the lungs every 6 (six) hours as needed for Wheezing. Rescue 4 g 2    levalbuterol (XOPENEX) 0.63 mg/3 mL nebulizer solution Take 3 mLs (0.63 mg total) by nebulization every 6 (six) hours as needed for Wheezing. Rescue 120 mL 1     No current facility-administered medications on file prior to visit.       Review of patient's allergies indicates:   Allergen Reactions    Levofloxacin      Other reaction(s): severe leg cramps       Review of Systems   Constitutional:  Negative for chills and fever.   Eyes:  Negative for blurred vision and double  "vision.   Respiratory:  Negative for cough and shortness of breath.    Cardiovascular:  Negative for chest pain.   Neurological:  Negative for dizziness and headaches.        Vital Signs: BP (!) 154/90 (BP Location: Right arm, Patient Position: Sitting, BP Method: Large (Automatic))   Pulse 95   Temp 98 °F (36.7 °C) (Temporal)   Resp 16   Ht 5' 5" (1.651 m)   Wt 113.5 kg (250 lb 3.6 oz)   SpO2 95%   BMI 41.64 kg/m²     ECOG Performance Status: 1 - Ambulates, capable of light work    Physical Exam  Vitals reviewed.   Constitutional:       General: She is not in acute distress.     Appearance: Normal appearance. She is not ill-appearing or toxic-appearing.   HENT:      Head: Normocephalic and atraumatic.      Nose: Nose normal.   Eyes:      Extraocular Movements: Extraocular movements intact.      Conjunctiva/sclera: Conjunctivae normal.      Pupils: Pupils are equal, round, and reactive to light.   Pulmonary:      Effort: Pulmonary effort is normal.   Chest:   Breasts:     Breasts are asymmetrical.      Right: No mass.      Left: Normal.       Musculoskeletal:         General: Normal range of motion.      Cervical back: Normal range of motion.   Lymphadenopathy:      Upper Body:      Right upper body: No supraclavicular, axillary or pectoral adenopathy.   Skin:     General: Skin is warm.   Neurological:      General: No focal deficit present.      Mental Status: She is alert and oriented to person, place, and time.      Cranial Nerves: No cranial nerve deficit.      Gait: Gait normal.   Psychiatric:         Mood and Affect: Mood normal.         Behavior: Behavior normal.         Thought Content: Thought content normal.         Judgment: Judgment normal.            Imaging: I have personally reviewed the patient's available images and reports and summarized pertinent findings above in HPI.     Pathology: I have personally reviewed the patient's available pathology and summarized pertinent findings above in HPI. "     This case was discussed with Dr. Danielson        - Thank you for allowing me to participate in the care of your patient.    Re Franco MD

## 2023-12-06 ENCOUNTER — PATIENT MESSAGE (OUTPATIENT)
Dept: HEMATOLOGY/ONCOLOGY | Facility: CLINIC | Age: 78
End: 2023-12-06
Payer: COMMERCIAL

## 2023-12-11 ENCOUNTER — TUMOR BOARD CONFERENCE (OUTPATIENT)
Dept: SURGERY | Facility: CLINIC | Age: 78
End: 2023-12-11
Payer: COMMERCIAL

## 2023-12-19 NOTE — NURSING
Spoke with pt.  She called and got her Dexa scan scheduled at [facility].  I asked her to call with any questions or concerns. She verbalized understanding.

## 2023-12-19 NOTE — PROGRESS NOTES
Interdisciplinary Breast Cancer Conference    Mina Quinonez    Female    Date Presented to Tumor Board: 12/11/23    Presenting Hospital / Clinic: [facility]    Tumor Laterality: Right    Breast Tumor Site: UOQ    Presenter: Dr Marj Danielson    Reason for Consultation: Initial Presentation    Specialties Present: Medical Oncology;Hematology;Radiation Oncology;Surgical Oncology;Pathology;Navigation;Integrative Oncology;Plastic Surgery    Patient Status: a current patient    Treatment to Date: None    Clinical Trial Eligibility: None available    Cancer Staging   Ductal carcinoma in situ (DCIS) of right breast  Staging form: Breast, AJCC 8th Edition  - Clinical stage from 12/4/2023: Stage 0 (cTis (DCIS), cN0, cM0, G2, ER+, ME: Not Assessed, HER2: Not Assessed) - Signed by Marj Danielson MD on 12/4/2023    Recommended Therapy:  Genetic testing  Surgery  Radiation   Endocrine Therapy  Final recommendations pending surgical  path       Metastatic Site(s): None    Presentation at Cancer Conference: Prospective

## 2024-01-05 PROBLEM — E11.36 TYPE 2 DIABETES MELLITUS WITH DIABETIC CATARACT, WITH LONG-TERM CURRENT USE OF INSULIN: Status: ACTIVE | Noted: 2024-01-05

## 2024-01-05 PROBLEM — Z79.4 TYPE 2 DIABETES MELLITUS WITH DIABETIC CATARACT, WITH LONG-TERM CURRENT USE OF INSULIN: Status: ACTIVE | Noted: 2024-01-05

## 2024-01-05 PROBLEM — E66.01 CLASS 3 OBESITY: Status: ACTIVE | Noted: 2024-01-05

## 2024-01-10 ENCOUNTER — TELEPHONE (OUTPATIENT)
Dept: SURGERY | Facility: CLINIC | Age: 79
End: 2024-01-10
Payer: MEDICAID

## 2024-01-10 NOTE — TELEPHONE ENCOUNTER
Patient called to notify us that she has a bad reaction to Norco and would like an alternative. I told her I would pass it on to Kristina ARTHUR and they will discuss it with her before surgery

## 2024-01-22 ENCOUNTER — TELEPHONE (OUTPATIENT)
Dept: HEMATOLOGY/ONCOLOGY | Facility: CLINIC | Age: 79
End: 2024-01-22
Payer: MEDICAID

## 2024-01-22 NOTE — TELEPHONE ENCOUNTER
Spoke with patient to book IO consult, pt asked me to call back, she was in a place where she could not here me.

## 2024-01-23 ENCOUNTER — TELEPHONE (OUTPATIENT)
Dept: HEMATOLOGY/ONCOLOGY | Facility: CLINIC | Age: 79
End: 2024-01-23
Payer: MEDICAID

## 2024-01-23 NOTE — TELEPHONE ENCOUNTER
Tried making contact with patient 3x and cannot get ahold of her to schedule IO consult per referral. Patient has information via portal.

## 2024-02-07 ENCOUNTER — DOCUMENTATION ONLY (OUTPATIENT)
Dept: ADMINISTRATIVE | Facility: OTHER | Age: 79
End: 2024-02-07
Payer: MEDICAID

## 2024-02-08 NOTE — PROGRESS NOTES
NAME: Mina Quinonez    : 1945 SEX: F MR#: [id number]   DIAGNOSIS: Ductal carcinoma in situ (DCIS) of the upper outer quadrant of the right breast.  Lumpectomy in 2024.  Group clinical stage 0  pTis (lumpectomy showed only 2 mm residual and final margins were 5 mm)  cN0  cM0  G2  ER positive.   REFERRING PHYSICIAN: Misha Pride M.D.   DATE OF CONSULTATION: 2024     PRESENT ILLNESS:  The patient is a 78-year-old single female.  The patient is a retired nun.  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  Biopsy of the right breast at 9 o'clock showed a benign lymph node.     On 2023, bilateral screening mammograms were performed.  An asymmetry in the upper outer quadrant with a few associated microcalcifications was noted.     On , bilateral diagnostic mammograms were performed.  I reviewed the images, as well as read the radiologist's interpretation.  The scans were unremarkable, except for a 7 mm region of pleomorphic microcalcifications in the upper outer quadrant.  Bilateral limited breast ultrasounds were unremarkable, except for evidence for the previous 2019 surgery.     On November 15, 2023, stereotactic biopsy of the upper outer quadrant of the right breast was performed.  Pathology showed cribriform-type ductal carcinoma in situ, intermediate nuclear grade with necrosis. ER positive.    On , the patient saw Dr. Danielson.  A lumpectomy with no sentinel node was recommended.  The patient also saw Dr. Re Franco in consultation and she recommended accelerated partial-breast irradiation with 30 Gy in five fractions with treatment delivered daily.      On 2024, a FRANK  lumpectomy was performed of the microcalcifications in the upper outer quadrant of the right breast.  Pathology showed ductal carcinoma in situ (DCIS) of the upper  outer quadrant of the right breast.  Lumpectomy showed only 2 mm residual and final margins were 5 mm.   ER positive.  There was no necrosis in this specimen.  .     On January 25 of this year, the patient saw Dr. Pride.  The patient has had gene testing and no mutations were detected.  The patient has been referred for Radiation Oncology consultation.  CBC is unremarkable.  Basic metabolic profile is remarkable for a glucose elevated at 266.     The patient has healed well from her surgery.  She denies problems.  The patient has recently started to eat a more healthy diet and she is losing weight.  She is dieting.  Her weight currently is 241 pounds.  The patient still is able to perform all of her daily functions of living and has a good performance status.    PAST MEDICAL HISTORY:  The patient's filled-out forms include symptoms, as well as past medical history and relevant factors have been reviewed.     Medical:  Ductal carcinoma in situ of the upper outer quadrant of the right breast, hypertension, diabetes, thyroid disease.     No previous radiation therapy or chemotherapy history.    PREVIOUS SURGERIES:  Orthopedic surgery in 1980, total abdominal hysterectomy with bilateral salpingo-oophorectomy in 1991.    FAMILY HISTORY:  Positive for father with prostate cancer, and a sister with uterine cancer.    TOBACCO HISTORY:  Negative.    ALCOHOL:  Rare alcohol use.    ALLERGIES:  Levaquin; Norco.    CURRENT MEDICATIONS:  The patient is on a number of medications and are recorded in the clinic records.    PHYSICAL EXAMINATION:  GENERAL:  Shows a lady in no distress.  She has a good performance status (performance score =0).  The patient weighs 241 pounds.    LYMPH NODES:  There is no supraclavicular or axillary lymphadenopathy.    BREASTS:  Left breast is negative.  Right breast shows a scar in the upper outer quadrant consistent with the recent lumpectomy.  Breast size is small.  Cosmetic result is good.  There  are no suspicious lesions in the breast.  There is some associated bruising.    LAB AND X-RAYS:  See Present Illness section.    ASSESSMENT/GOALS/PLAN:  The patient has a history of Ductal carcinoma in situ (DCIS) of the upper outer quadrant of the right breast.  Lumpectomy in January 2024.  Group clinical stage 0  pTis (lumpectomy showed only 2 mm residual and final margins were 5 mm)  cN0  cM0  G2  ER positive.    I had a detailed and lengthy discussion with the patient and her friend.  According to the NCCN guidelines, the patient should consider whole breast irradiation or accelerated partial-breast irradiation.  In some situations, avoiding radiation therapy altogether may be appropriate if the patient has very low-risk factors.  According to the NCCN listed risk factors, the patient does have very low risk disease.     The patient would like to avoid radiation.  After considerable discussion with the patient and her friend, I have ordered an Oncotype test for DCIS.  If this Oncotype testing for DCIS suggests low risk, the patient would strongly prefer to avoid any radiation.  On the other hand, if this testing suggests more than very low risk, the patient will agree to radiation treatment and she probably would be in favor of the accelerated partial-breast irradiation, which is an appropriate option.      After a discussion with the patient, I have ordered a right diagnostic mammogram and this is to confirm removal of all microcalcifications, and the removal of all the microcalcifications seems highly likely.  The patient understands that routine follow up mammograms will be important in the future.   I will see the patient in follow up after the Oncotype test for DCIS.       130 minutes was spent in this follow up, of which 35 minutes was spent in record and image review, 80 minutes was spent directly with the patient and her friend, and 15 minutes was spent in documentation and NCCN guideline review.     I  would like to thank all Ms. Quinonez's physicians for the opportunity to consult on their patients.  Any of these physicians should feel free to call me with any questions or comments.           Electronically Signed By:  EMIL Spiecr/Ko  DD:  02/07/2024  DT:  02/07/2024  Job #:  [id number]    CC:  Marj Danielson M.D., [address] [address], Fax: [phone]        Dyan Méndez M.D., [facility] [address] [address], Fax: [phone]

## 2024-02-23 ENCOUNTER — DOCUMENTATION ONLY (OUTPATIENT)
Dept: ADMINISTRATIVE | Facility: OTHER | Age: 79
End: 2024-02-23
Payer: MEDICAID

## 2024-02-24 NOTE — PROGRESS NOTES
2024      Misha Pride M.D.  [address] [place] [address]    RE: Mina Quinonez     MR#:  [id number]    :  1945       DX:   Ductal carcinoma in situ (DCIS) of the upper outer quadrant of the right breast.  Lumpectomy in 2024.  Group clinical stage 0 pTis (lumpectomy showed only 2 mm residual and final margins were 5 mm) cN0 cM0 G2, ER positive.   Oncotype DX for DCIS score estimated that the patient's risk of any local recurrence within 10 years with only the lumpectomy that the patient has already had is 10% (this 10% assumes no radiation treatment or endocrine therapy)          Dear Salvador:     I am seeing your patient in follow up today and this note will supplement information since my 2024 consult.      On , a diagnostic right mammogram was performed which was negative, except for postoperative changes.  There were no residual microcalcifications.     Just received the results for the Oncotype DX breast DCIS score.  The test estimates that the risk of any local recurrence within 10 years with only the lumpectomy that the patient has already had is 10% (this 10% assumes no radiation treatment or endocrine therapy).      Assessment and Plan:  I had a detailed discussion with the patient.  She has elected against radiation therapy and she has elected against endocrine therapy, and in this situation I think that is an appropriate option.  The patient will continue with her routine mammograms.  In November, I will order bilateral diagnostic mammograms and ultrasound if needed.  I will see the patient in follow up after that.  The patient then would continue with yearly mammograms.      25 minutes was spent in this follow up, of which five minutes was spent in record review, 15 minutes was spent directly with the patient, and five minutes was spent in documentation.     I appreciate the opportunity to consult on your patients.  Please call me with any questions or  comments.     Sincerely,          Electronically Signed By:  EMIL Spicer/Ko  DD:  02/23/2024  DT:  02/23/2024  Job #:  [id number]    CC:  Marj Danielson M.D., [address] [address], Fax: [phone]        Dyan Méndez M.D., [facility] [address] [address], Fax: [phone]

## 2024-02-28 ENCOUNTER — TELEPHONE (OUTPATIENT)
Dept: SURGERY | Facility: CLINIC | Age: 79
End: 2024-02-28
Payer: MEDICAID

## 2024-02-28 NOTE — TELEPHONE ENCOUNTER
Patient called to give us an update on how she is doing, she stated her Oncotype came back low and she does not need chemo, that her scar is all healed and looks great and that she has resumed swimming and working out without any problems   Not Applicable